# Patient Record
Sex: FEMALE | Race: ASIAN | URBAN - METROPOLITAN AREA
[De-identification: names, ages, dates, MRNs, and addresses within clinical notes are randomized per-mention and may not be internally consistent; named-entity substitution may affect disease eponyms.]

---

## 2017-11-21 ENCOUNTER — TELEPHONE (OUTPATIENT)
Dept: PEDIATRICS | Facility: CLINIC | Age: 27
End: 2017-11-21

## 2017-11-21 NOTE — TELEPHONE ENCOUNTER
"Patient Communication Preferences indicate  Do not contact  and/or communication by \"Phone\" is not preferred. Call not required per Outreach team.        Outreach ,  Elio Jain     "